# Patient Record
Sex: MALE | Race: WHITE | Employment: OTHER
[De-identification: names, ages, dates, MRNs, and addresses within clinical notes are randomized per-mention and may not be internally consistent; named-entity substitution may affect disease eponyms.]

---

## 2022-03-18 PROBLEM — Z87.442 HISTORY OF KIDNEY STONES: Status: ACTIVE | Noted: 2018-01-23

## 2022-03-20 PROBLEM — R31.29 MICROHEMATURIA: Status: ACTIVE | Noted: 2018-01-23

## 2023-09-11 ENCOUNTER — HOSPITAL ENCOUNTER (OUTPATIENT)
Facility: HOSPITAL | Age: 73
Setting detail: RECURRING SERIES
Discharge: HOME | End: 2023-09-14
Payer: COMMERCIAL

## 2023-09-11 PROCEDURE — 97161 PT EVAL LOW COMPLEX 20 MIN: CPT

## 2023-09-11 PROCEDURE — 97535 SELF CARE MNGMENT TRAINING: CPT

## 2023-09-11 NOTE — PROGRESS NOTES
[facility]  [address], [address] [facility]  Ph [phone]  Fx [phone]    Plan of Care/ Statement of Necessity for Physical Therapy Services      Patient name: Leonard Ball Start of Care: 2023   Referral source: Nikki Douglas : 1950    Medical Diagnosis: Lymphedema, not elsewhere classified [I89.0]   Onset Date:2 years ago    Treatment Diagnosis: I89.0 Lymphedema, not elsewhere classified     Prior Hospitalization: see medical history Provider#: [id number]   Medications: Verified on Patient summary List   Comorbidities: eczema; Venous hypertension. Sleep apnea, Afib, Morgan Felix ([facility]); Kidney stones, Appendectomy, gull bladder surgery,   Prior Level of Function: functionally independent, no AD, sedentary lifestyle      The Plan of Care and following information is based on the information from the initial evaluation. Assessment/ key information: 76 yo male who [facility] with c/o jett leg lymphedema. Patient  Pt reported that his swelling has been going on for 2 years ago. And first noticed with foot swelling. Pt reported that the limb started to weep in Feb/March. Pt was sent to a wound clinic in [place] and was seen for 3-4 times. And was introduced to compression garments. Pts wound has been healed. Pt was then referred to vascular. Pt received Ultra sound and has venous reflux on the right. Patient reports current management is over the counter garments and elevation. PT assessed garments however they did not fit pt and was creating tourniquets. PT recommends referral for medical grade compression. PT will need to clear MLD through Cardiac MD prior to referral for pump or initiation of MLD. 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 Patient demonstrates decreased strength, impaired fluid retention, pain and decreased functional mobility tolerance.      Patient will continue to

## 2023-09-11 NOTE — PROGRESS NOTES
PT DAILY TREATMENT NOTE/LYMPHLEG EVAL 10-18    Patient Name: Yossi Rucker    Date: 2023    : 1950  Insurance: Payor: Champ Lozano / Plan: Olive Azul / Product Type: *No Product type* /      Patient  verified yes     Visit #   Current / Total 1 24   Time   In / Out 239 315   Pain   In / Out 0 0     TREATMENT AREA =  Lymphedema, not elsewhere classified [I89.0]    SUBJECTIVE    Any medication changes, allergies to medications, adverse drug reactions, diagnosis change, or new procedure performed?: [x] No    [] Yes (see summary sheet for update)  Subjective functional status/changes:     PLOF: functionally independent, no AD, sedentary lifestyle  Limitations to PLOF: Difficulty wearing pants, difficulty walking, difficulty with wearing shoes   Current symptoms/Complaints: 0/10 at best; 3/10 at worst; pt reports they are able to sleep through the night   PMHx/Surgical Hx: eczema; Venous hypertension. Sleep apnea, Afib, Morgan Felix ([facility]); Kidney stones, Appendectomy, gull bladder surgery,     Lymphedema History: [] Primary [x] Secondary Cause: Pt reported that his swelling has been going on for 2 years ago. And first noticed with foot swelling. Pt reported that the limb started to weep in Feb/March. Pt was sent to a wound clinic in [place] and was seen for 3-4 times. And was introduced to compression garments. Pts wound has been healed. Pt was then referred to vascular. Pt received Ultra sound and has venous reflux on the right.        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

## 2023-09-15 ENCOUNTER — TELEPHONE (OUTPATIENT)
Facility: HOSPITAL | Age: 73
End: 2023-09-15

## 2023-09-19 ENCOUNTER — TELEPHONE (OUTPATIENT)
Facility: HOSPITAL | Age: 73
End: 2023-09-19

## 2023-09-19 ENCOUNTER — APPOINTMENT (OUTPATIENT)
Facility: HOSPITAL | Age: 73
End: 2023-09-19
Payer: COMMERCIAL

## 2023-09-19 NOTE — TELEPHONE ENCOUNTER
Spoke with Dr. Carine Amaya from [facility] who stated that pt has stable A fib and Aortic Aneurysm but risk would be low and pt is able to recieve lymphatic pump and be treated with MLD.

## 2023-09-26 ENCOUNTER — HOSPITAL ENCOUNTER (OUTPATIENT)
Facility: HOSPITAL | Age: 73
Setting detail: RECURRING SERIES
Discharge: HOME | End: 2023-09-29
Payer: COMMERCIAL

## 2023-09-26 PROCEDURE — 97112 NEUROMUSCULAR REEDUCATION: CPT

## 2023-09-26 PROCEDURE — 97530 THERAPEUTIC ACTIVITIES: CPT

## 2023-09-26 PROCEDURE — 97110 THERAPEUTIC EXERCISES: CPT

## 2023-09-26 PROCEDURE — 97535 SELF CARE MNGMENT TRAINING: CPT

## 2023-09-26 NOTE — PROGRESS NOTES
PHYSICAL / OCCUPATIONAL THERAPY - DAILY TREATMENT NOTE (updated )    Patient Name: Katrin Gonzalez    Date: 2023    : 1950  Insurance: Payor: French Bueno / Plan: [facility] / Product Type: *No Product type* /      Patient  verified Yes     Visit #   Current / Total 2 24   Time   In / Out 300 408   Pain   In / Out 0 0   Subjective Functional Status/Changes: Pt reported that he is only able to come in 1 time due to his work    Changes to: Allergies, Med Hx, Sx Hx?   no       TREATMENT AREA =  Lymphedema, not elsewhere classified [I89.0]    OBJECTIVE    Therapeutic Procedures: Tx Min Billable or 1:1 Min (if diff from Tx Min) Procedure, Rationale, Specifics   24  71327 Therapeutic Exercise (timed):  increase ROM, strength, coordination, balance, and proprioception to improve patient's ability to progress to PLOF and address remaining functional goals. (see flow sheet as applicable)    Details if applicable:  AROM/ Self MLD     8 8 40103 Therapeutic Activity (timed):  use of dynamic activities replicating functional movements to increase ROM, strength, coordination, balance, and proprioception in order to improve patient's ability to progress to PLOF and address remaining functional goals. (see flow sheet as applicable)    Details if applicable:  stocking donning and doffing of edema wear    63147 Self Care/Home Management (timed):  improve patient knowledge and understanding of activity modification, diagnosis/prognosis, and physical therapy expectations, procedures and progression  to improve patient's ability to progress to PLOF and address remaining functional goals.   (see flow sheet as applicable)     Details if applicable:  education on breaks during sitting and typing, education on skin care, education on prevention measures and decrease inflammation response   8 8 83341 Neuromuscular Re-Education (timed):  improve balance, coordination, kinesthetic sense, posture, core stability and

## 2023-10-03 ENCOUNTER — HOSPITAL ENCOUNTER (OUTPATIENT)
Facility: HOSPITAL | Age: 73
Setting detail: RECURRING SERIES
Discharge: HOME | End: 2023-10-06
Payer: COMMERCIAL

## 2023-10-03 PROCEDURE — 97112 NEUROMUSCULAR REEDUCATION: CPT

## 2023-10-03 PROCEDURE — 97110 THERAPEUTIC EXERCISES: CPT

## 2023-10-03 PROCEDURE — 97535 SELF CARE MNGMENT TRAINING: CPT

## 2023-10-03 NOTE — PROGRESS NOTES
Avis Manjarrez [facility]   11/28/2023  4:30 PM Bereket Carmona [facility]   12/5/2023  4:30 PM Bereket Carmona [facility]   12/12/2023  4:30 PM Bereket Carmona [facility]   12/19/2023  4:30 PM Bereket Carmona [facility]   12/26/2023  4:30 PM Bereket Carmona [facility]

## 2023-10-10 ENCOUNTER — HOSPITAL ENCOUNTER (OUTPATIENT)
Facility: HOSPITAL | Age: 73
Setting detail: RECURRING SERIES
Discharge: HOME | End: 2023-10-13
Payer: COMMERCIAL

## 2023-10-10 ENCOUNTER — TELEPHONE (OUTPATIENT)
Facility: HOSPITAL | Age: 73
End: 2023-10-10

## 2023-10-10 ENCOUNTER — APPOINTMENT (OUTPATIENT)
Facility: HOSPITAL | Age: 73
End: 2023-10-10
Payer: COMMERCIAL

## 2023-10-10 ENCOUNTER — HOSPITAL ENCOUNTER (EMERGENCY)
Facility: HOSPITAL | Age: 73
Discharge: HOME | End: 2023-10-10
Payer: COMMERCIAL

## 2023-10-10 VITALS
HEART RATE: 81 BPM | DIASTOLIC BLOOD PRESSURE: 101 MMHG | OXYGEN SATURATION: 94 % | WEIGHT: 260 LBS | SYSTOLIC BLOOD PRESSURE: 147 MMHG | BODY MASS INDEX: 39.4 KG/M2 | TEMPERATURE: 97.8 F | HEIGHT: 68 IN | RESPIRATION RATE: 20 BRPM

## 2023-10-10 DIAGNOSIS — L03.115 CELLULITIS OF RIGHT LOWER EXTREMITY: Primary | ICD-10-CM

## 2023-10-10 LAB — ECHO BSA: 2.38 M2

## 2023-10-10 PROCEDURE — 99284 EMERGENCY DEPT VISIT MOD MDM: CPT

## 2023-10-10 PROCEDURE — 6370000000 HC RX 637 (ALT 250 FOR IP): Performed by: PHYSICIAN ASSISTANT

## 2023-10-10 PROCEDURE — 97535 SELF CARE MNGMENT TRAINING: CPT

## 2023-10-10 PROCEDURE — 93971 EXTREMITY STUDY: CPT

## 2023-10-10 RX ORDER — CEPHALEXIN 250 MG/1
250 CAPSULE ORAL
Status: COMPLETED | OUTPATIENT
Start: 2023-10-10 | End: 2023-10-10

## 2023-10-10 RX ORDER — CEPHALEXIN 250 MG/1
250 CAPSULE ORAL 4 TIMES DAILY
Qty: 28 CAPSULE | Refills: 0 | Status: SHIPPED | OUTPATIENT
Start: 2023-10-10 | End: 2023-10-17

## 2023-10-10 RX ADMIN — CEPHALEXIN 250 MG: 250 CAPSULE ORAL at 19:11

## 2023-10-10 ASSESSMENT — PAIN - FUNCTIONAL ASSESSMENT: PAIN_FUNCTIONAL_ASSESSMENT: 0-10

## 2023-10-10 ASSESSMENT — PAIN SCALES - GENERAL: PAINLEVEL_OUTOF10: 8

## 2023-10-10 NOTE — ED PROVIDER NOTES
nontoxic. Disposition Considerations (tests considered but not done, Admit vs D/C, Shared Decision Making, Pt Expectation of Test or Tx.):        Diagnosis and Disposition       1. Cellulitis of right lower extremity        DISPOSITION Decision To Discharge 10/10/2023 06:57:53 PM      PATIENT REFERRED TO:  Mumtaz Bruno, [address]  [phone]    Schedule an appointment as soon as possible for a visit       [facility] EMERGENCY DEPT  [address] [place] [address]  [phone]    If symptoms worsen      DISCHARGE MEDICATIONS:  New Prescriptions    CEPHALEXIN (KEFLEX) 250 MG CAPSULE    Take 1 capsule by mouth 4 times daily for 7 days       DISCONTINUED MEDICATIONS:  Discontinued Medications    No medications on file              (Please note that portions of this note were completed with a voice recognition program.  Efforts were made to edit the dictations but occasionally words are mis-transcribed.)    MARTHA Grubbs (electronically signed)    Amina JON PA-C, am the primary clinician of record. [employer] Disclaimer     Please note that this dictation was completed with [employer], the computer voice recognition software. Quite often unanticipated grammatical, syntax, homophones, and other interpretive errors are inadvertently transcribed by the computer software. Please disregard these errors. Please excuse any errors that have escaped final proofreading.     Amina Angeles PA-C  (Electronically signed)         Mili Ricardo, [address] [place] [address]  10/10/23 5451

## 2023-10-10 NOTE — ED TRIAGE NOTES
Accidental fall 1 week ago today, has had home physical therapy since. Therapist noted that RLE is significantly more swollen, red, warm, bruised to touch, recommended to go to ED, +blood thinner use for Afib.  Pt is ambulatory but states pain is significantly worse when doing so

## 2023-10-10 NOTE — PROGRESS NOTES
PHYSICAL / OCCUPATIONAL THERAPY - DAILY TREATMENT NOTE (updated )    Patient Name: Keira Florentino    Date: 10/10/2023    : 1950  Insurance: Payor: Jacoby Vieira / Plan: Sonja Olivas / Product Type: *No Product type* /      Patient  verified Yes     Visit #   Current / Total 4 24   Time   In / Out 430 440   Pain   In / Out 0 0   Subjective Functional Status/Changes: Pt reported that swelling has increased in right leg since last visit. He is having trouble putting weight through it. It is hot and red. Changes to: Allergies, Med Hx, Sx Hx? yes  see above     TREATMENT AREA =  Lymphedema, not elsewhere classified [I89.0]    OBJECTIVE    Therapeutic Procedures: Tx Min Billable or 1:1 Min (if diff from Tx Min) Procedure, Rationale, Specifics   10 10 67083 Self Care/Home Management (timed):  improve patient knowledge and understanding of blood clots   to improve patient's ability to progress to PLOF and address remaining functional goals. (see flow sheet as applicable)    Details if applicable:  education regarding need for medical over site prior to resumption of treatment     10 10 [facility] Totals Reminder: bill using total billable min of TIMED therapeutic procedures (example: do not include dry needle or estim unattended, both untimed codes, in totals to left)  8-22 min = 1 unit; 23-37 min = 2 units; 38-52 min = 3 units; 53-67 min = 4 units; 68-82 min = 5 units   Total Total     TOTAL TREATMENT TIME:        10     [x]  Patient Education billed concurrently with other procedures   [x] Review HEP    [] Progressed/Changed HEP, detail:    [] Other detail:       Objective Information/Functional Measures/Assessment  Patient arrived with swollen Right LE despite wearing edema wear all week. Pts right limb was red from knee to ankle with warmth to touch. Recommended that pt go to ER and check for blood clot due to new swelling redness and warmth. PT held visit today and sent pt to ER to be checked.   Pt was due

## 2023-10-17 ENCOUNTER — APPOINTMENT (OUTPATIENT)
Facility: HOSPITAL | Age: 73
End: 2023-10-17
Payer: COMMERCIAL

## 2023-10-24 ENCOUNTER — APPOINTMENT (OUTPATIENT)
Facility: HOSPITAL | Age: 73
End: 2023-10-24
Payer: COMMERCIAL

## 2023-10-26 ENCOUNTER — HOSPITAL ENCOUNTER (OUTPATIENT)
Facility: HOSPITAL | Age: 73
Setting detail: RECURRING SERIES
Discharge: HOME | End: 2023-10-29
Payer: COMMERCIAL

## 2023-10-26 PROCEDURE — 97140 MANUAL THERAPY 1/> REGIONS: CPT

## 2023-10-26 PROCEDURE — 97535 SELF CARE MNGMENT TRAINING: CPT

## 2023-10-26 NOTE — PROGRESS NOTES
PHYSICAL / OCCUPATIONAL THERAPY - DAILY TREATMENT NOTE (updated )    Patient Name: Yossi Rucker    Date: 10/26/2023    : 1950  Insurance: Payor: Champ Lozano / Plan: Olive Azul / Product Type: *No Product type* /      Patient  verified Yes     Visit #   Current / Total 5 24   Time   In / Out 310 350   Pain   In / Out 0 0   Subjective Functional Status/Changes: Pt reported that he was neg for blood clot and has been taking antibiotics for cellulitis    Changes to: Allergies, Med Hx, Sx Hx? yes  see above      TREATMENT AREA =  Lymphedema, not elsewhere classified [I89.0]    OBJECTIVE         Therapeutic Procedures: Tx Min Billable or 1:1 Min (if diff from Tx Min) Procedure, Rationale, Specifics   30 30 40890 Manual Therapy (timed):  decrease pain, increase ROM, increase tissue extensibility, and decrease edema to improve patient's ability to progress to PLOF and address remaining functional goals. The manual therapy interventions were performed at a separate and distinct time from the therapeutic activities interventions . Details: MLD to right Lower leg spot treatment from popliteal nodes to ankle    Details if applicable:       10 10 99137 Self Care/Home Management (timed):  improve patient knowledge and understanding of physical therapy expectations, procedures and progression and long term management of lymphedema  to improve patient's ability to progress to PLOF and address remaining functional goals.   (see flow sheet as applicable)    Details if applicable:     36 40 [facility] Totals Reminder: bill using total billable min of TIMED therapeutic procedures (example: do not include dry needle or estim unattended, both untimed codes, in totals to left)  8-22 min = 1 unit; 23-37 min = 2 units; 38-52 min = 3 units; 53-67 min = 4 units; 68-82 min = 5 units   Total Total     TOTAL TREATMENT TIME:        40     [x]  Patient Education billed concurrently with other procedures   [x] Review HEP    []

## 2023-10-26 NOTE — PROGRESS NOTES
[facility] at [address]  [facility], [address] [facility]  Ph [phone]  Fx [phone]    Physical Therapy Progress Note  Patient name: Andrea Resendiz Start of Care: 2023   Referral source: Lauro Tejada : 1950               Medical Diagnosis: Lymphedema, not elsewhere classified [I89.0]    Onset Date:2 years ago               Treatment Diagnosis: I89.0 Lymphedema, not elsewhere classified     Prior Hospitalization: see medical history Provider#: [id number]   Medications: Verified on Patient summary List   Comorbidities: eczema; Venous hypertension. Sleep apnea, Afib, Timfan Felix ([facility]); Kidney stones, Appendectomy, gull bladder surgery,   Prior Level of Function: functionally independent, no AD, sedentary lifestyle    Visits from Start of Care: 5    Missed Visits: 2    Updated Goals/Measure of Progress: To be achieved in 24 treatments:    Short Term Goals: To be accomplished in 12 treatments:  Patient will be independent and compliant with HEP to progress toward goals and restore functional mobility. Eval Status: issued at eval  10/26/23: reviewed partial compliance      Patient will be independent and compliant with Self MLD to progress toward goals and improve independent management of Lymphedema. Eval Status: to be issued at future visit once cleared by cardiac MD   23: given today  10/26/23: reviewed max cueing      Patient will improve LLIS score by 10 %  in order to maximize function and promote patient satisfaction with overall outcome. Eval Status: LLIS to be taken at visit 2  23: 25%      Pt will be independent with teach back of lymphedema risk reduction techniques in order to self manage lymphedema.   Eval Status: to be given at visit 2  23: given today   10/26/23: reviewed today      Pt will decrease 10cm from malleoli right leg circumference measurement by 5 centimeters in order to improve pts ability to wear

## 2023-10-31 ENCOUNTER — HOSPITAL ENCOUNTER (OUTPATIENT)
Facility: HOSPITAL | Age: 73
Setting detail: RECURRING SERIES
Discharge: HOME | End: 2023-11-03
Payer: COMMERCIAL

## 2023-10-31 PROCEDURE — 97535 SELF CARE MNGMENT TRAINING: CPT

## 2023-10-31 NOTE — PROGRESS NOTES
PHYSICAL / OCCUPATIONAL THERAPY - DAILY TREATMENT NOTE (updated )    Patient Name: Maria M Stewart    Date: 10/31/2023    : 1950  Insurance: Payor: [employer] / Plan: Rivera Miracle / Product Type: *No Product type* /      Patient  verified Yes     Visit #   Current / Total 6 24   Time   In / Out 310 404   Pain   In / Out 0 0   Subjective Functional Status/Changes: Pt reported that he brought his bandages    Changes to: Allergies, Med Hx, Sx Hx?   no       TREATMENT AREA =  Lymphedema, not elsewhere classified [I89.0]    OBJECTIVE    Therapeutic Procedures: Tx Min Billable or 1:1 Min (if diff from Tx Min) Procedure, Rationale, Specifics   54 54 60388 Self Care/Home Management (timed):  improve patient knowledge and understanding of diagnosis/prognosis, physical therapy expectations, procedures and progression, and multilayer compression bandaging   to improve patient's ability to progress to PLOF and address remaining functional goals. (see flow sheet as applicable)    Details if applicable:       54 47 [facility] Totals Reminder: bill using total billable min of TIMED therapeutic procedures (example: do not include dry needle or estim unattended, both untimed codes, in totals to left)  8-22 min = 1 unit; 23-37 min = 2 units; 38-52 min = 3 units; 53-67 min = 4 units; 68-82 min = 5 units   Total Total     TOTAL TREATMENT TIME:        54     [x]  Patient Education billed concurrently with other procedures   [x] Review HEP    [] Progressed/Changed HEP, detail:    [] Other detail:       Objective Information/Functional Measures/Assessment    Patient tolerated treatment session well today. Patient had no complaints with addition of multilayer compression bandaging  to exercise program to accomplish improved fluid management. Pt reported that his spot on his knee has started to feel like a sore. PT looked at limb and there was a small area of black tissue and open wound.   Patient continues to make steady progress

## 2023-11-02 ENCOUNTER — TELEPHONE (OUTPATIENT)
Facility: HOSPITAL | Age: 73
End: 2023-11-02

## 2023-11-03 ENCOUNTER — APPOINTMENT (OUTPATIENT)
Facility: HOSPITAL | Age: 73
End: 2023-11-03
Payer: COMMERCIAL

## 2023-11-07 ENCOUNTER — APPOINTMENT (OUTPATIENT)
Facility: HOSPITAL | Age: 73
End: 2023-11-07
Payer: COMMERCIAL

## 2023-11-10 ENCOUNTER — HOSPITAL ENCOUNTER (OUTPATIENT)
Facility: HOSPITAL | Age: 73
Setting detail: RECURRING SERIES
Discharge: HOME | End: 2023-11-13
Payer: COMMERCIAL

## 2023-11-10 PROCEDURE — 97535 SELF CARE MNGMENT TRAINING: CPT

## 2023-11-10 PROCEDURE — 97110 THERAPEUTIC EXERCISES: CPT

## 2023-11-10 NOTE — PROGRESS NOTES
Patient Education billed concurrently with other procedures   [x] Review HEP    [] Progressed/Changed HEP, detail:    [] Other detail:       Objective Information/Functional Measures/Assessment    Patient tolerated treatment session well today. Patients wife attended session today to see AROM and multilayer compression bandaging to exercise program to accomplish improve lymphatic activation. Pt and wife given demo of multilayer compression bandaging. Discussed that pt may be ready for transition to stocking on left. Patient continues to make slow steady progress toward goals and would benefit from continued skilled PT intervention to address remaining deficits outlined in goals below. Patient will continue to benefit from skilled PT services to modify and progress therapeutic interventions, analyze and address soft tissue restrictions and decrease fliud retension and improve tissue healing to attain remaining goals. Progress toward goals / Updated goals:  []  See Progress Note/Recertification    Short Term Goals: To be accomplished in 12 treatments:  Patient will be independent and compliant with HEP to progress toward goals and restore functional mobility. Eval Status: issued at eval  10/26/23: reviewed partial compliance      Patient will be independent and compliant with Self MLD to progress toward goals and improve independent management of Lymphedema. Eval Status: to be issued at future visit once cleared by cardiac MD   9/26/23: given today  10/26/23: reviewed max cueing      Patient will improve LLIS score by 10 %  in order to maximize function and promote patient satisfaction with overall outcome. Eval Status: LLIS to be taken at visit 2  9/26/23: 25%      Pt will be independent with teach back of lymphedema risk reduction techniques in order to self manage lymphedema.   Eval Status: to be given at visit 2  9/26/23: given today   10/26/23: reviewed today      Pt will decrease 10cm from

## 2023-11-14 ENCOUNTER — HOSPITAL ENCOUNTER (OUTPATIENT)
Facility: HOSPITAL | Age: 73
Setting detail: RECURRING SERIES
Discharge: HOME | End: 2023-11-17
Payer: COMMERCIAL

## 2023-11-14 PROCEDURE — 97530 THERAPEUTIC ACTIVITIES: CPT

## 2023-11-14 PROCEDURE — 97535 SELF CARE MNGMENT TRAINING: CPT

## 2023-11-14 NOTE — PROGRESS NOTES
PHYSICAL / OCCUPATIONAL THERAPY - DAILY TREATMENT NOTE (updated )    Patient Name: Abundio Holliday    Date: 2023    : 1950  Insurance: Payor: Les Rivera / Plan: Dona Hicks / Product Type: *No Product type* /      Patient  verified Yes     Visit #   Current / Total 8 24   Time   In / Out 430 530   Pain   In / Out 0 0   Subjective Functional Status/Changes: Pt reported that he is trying to be a good patient. Changes to: Allergies, Med Hx, Sx Hx?   no       TREATMENT AREA =  Lymphedema, not elsewhere classified [I89.0]    OBJECTIVE    Therapeutic Procedures: Tx Min Billable or 1:1 Min (if diff from Tx Min) Procedure, Rationale, Specifics   52 52 76131 Self Care/Home Management (timed):  improve patient knowledge and understanding of activity modification, diagnosis/prognosis, and physical therapy expectations, procedures and progression  to improve patient's ability to progress to PLOF and address remaining functional goals. (see flow sheet as applicable)    Details if applicable:  long term management and garments, multilevel compression bandaging     Multilayer compression bandaging: right LE: 4 in stockinet, cotton, grey foam clamshell forefoot and medial/lateral malleoli pieces, binder,  6 cm laquita sandal, 8 cm HAS bandage, 10 cm circular malleoli to knee, and 10 cm circulare malleoli to knee      8 8 16937 Therapeutic Activity (timed):  use of dynamic activities replicating functional movements to increase ROM, strength, coordination, balance, and proprioception in order to improve patient's ability to progress to PLOF and address remaining functional goals.   (see flow sheet as applicable)    Details if applicable:  garment donning    60 60 [facility] Totals Reminder: bill using total billable min of TIMED therapeutic procedures (example: do not include dry needle or estim unattended, both untimed codes, in totals to left)  8-22 min = 1 unit; 23-37 min = 2 units; 38-52 min = 3 units; 53-67 min = 4

## 2023-11-21 ENCOUNTER — HOSPITAL ENCOUNTER (OUTPATIENT)
Facility: HOSPITAL | Age: 73
Setting detail: RECURRING SERIES
Discharge: HOME | End: 2023-11-24
Payer: COMMERCIAL

## 2023-11-21 PROCEDURE — 97535 SELF CARE MNGMENT TRAINING: CPT

## 2023-11-21 PROCEDURE — 97110 THERAPEUTIC EXERCISES: CPT

## 2023-11-21 NOTE — PROGRESS NOTES
[facility] [address] Tino Naraayn, [address] [facility]  Ph [phone]  Fx [phone]    Physical Therapy Progress Note  Patient name: Yossi Rucker Start of Care: 2023   Referral source: Amira Michel : 1950               Medical Diagnosis: Lymphedema, not elsewhere classified [I89.0]    Onset Date:2 years ago               Treatment Diagnosis: I89.0 Lymphedema, not elsewhere classified     Prior Hospitalization: see medical history Provider#: [id number]   Medications: Verified on Patient summary List   Comorbidities: eczema; Venous hypertension. Sleep apnea, Afib, Timfan Felix ([facility]); Kidney stones, Appendectomy, gull bladder surgery,   Prior Level of Function: functionally independent, no AD, sedentary lifestyle    Visits from Start of Care: 9    Missed Visits: 3    Updated Goals/Measure of Progress: To be achieved in 24 treatments:    Short Term Goals: To be accomplished in 12 treatments:  Patient will be independent and compliant with HEP to progress toward goals and restore functional mobility. Eval Status: issued at eval  10/26/23: reviewed partial compliance      Patient will be independent and compliant with Self MLD to progress toward goals and improve independent management of Lymphedema. Eval Status: to be issued at future visit once cleared by cardiac MD   23: given today  10/26/23: reviewed max cueing   23: reviewed max cueing pt given youtube video to follow      Patient will improve LLIS score by 10 %  in order to maximize function and promote patient satisfaction with overall outcome. Eval Status: LLIS to be taken at visit 2  23: 25%   23: 19 % Progressing      Pt will be independent with teach back of lymphedema risk reduction techniques in order to self manage lymphedema.   Eval Status: to be given at visit 2  10/26/23: reviewed today   23: reviewed today min cueing      Pt will decrease 10cm
11/21/23 PN PROGRESSING   Circumference measurements right (cm) left (cm)   Forefoot  24.5 23.5   Figure 8 59 57   Malleoli  31 29   10 cm proximal to malleoli 39 33   20 cm proximal to malleoli 42 39   10 cm distal to knee 43 42   Patella  47 41         PLAN  Yes  Continue plan of care  []  Upgrade activities as tolerated  []  Discharge due to :  []  Other:    Nati Morillo PT    11/21/2023    10:26 AM    Future Appointments   Date Time Provider [address]   11/21/2023  4:30 PM Nati Morillo PT [facility]   11/28/2023  4:30 PM Nati Morillo PT [facility]   12/5/2023  4:30 PM Torito Block [facility]   12/12/2023  4:30 PM Torito Block [facility]   12/19/2023  4:30 PM Torito Block [facility]   12/28/2023  4:30 PM Torito Block [facility]

## 2023-11-27 ENCOUNTER — TELEPHONE (OUTPATIENT)
Facility: HOSPITAL | Age: 73
End: 2023-11-27

## 2023-11-28 ENCOUNTER — APPOINTMENT (OUTPATIENT)
Facility: HOSPITAL | Age: 73
End: 2023-11-28
Payer: COMMERCIAL

## 2023-12-05 ENCOUNTER — HOSPITAL ENCOUNTER (OUTPATIENT)
Facility: HOSPITAL | Age: 73
Setting detail: RECURRING SERIES
Discharge: HOME | End: 2023-12-08
Payer: COMMERCIAL

## 2023-12-05 PROCEDURE — 97530 THERAPEUTIC ACTIVITIES: CPT

## 2023-12-05 PROCEDURE — 97535 SELF CARE MNGMENT TRAINING: CPT

## 2023-12-05 NOTE — PROGRESS NOTES
[facility] [place] [address] [address] [place], [address] [facility]  Ph [phone]  Fx [phone]    Physical Therapy Discharge Summary    Patient name: Katrin Gonzalez Start of Care: 2023   Referral source: Tulio Sexton : 1950               Medical Diagnosis: Lymphedema, not elsewhere classified [I89.0]    Onset Date:2 years ago               Treatment Diagnosis: I89.0 Lymphedema, not elsewhere classified     Prior Hospitalization: see medical history Provider#: [id number]   Medications: Verified on Patient summary List   Comorbidities: eczema; Venous hypertension. Sleep apnea, Afib, [facility] Kidney stones, Appendectomy, gull bladder surgery,   Prior Level of Function: functionally independent, no AD, sedentary lifestyle    Visits from Start of Care: 10    Missed Visits: 3    Reporting Period : 23 to 23    Goals/Measure of Progress:  Short Term Goals: To be accomplished in 12 treatments:  Patient will be independent and compliant with HEP to progress toward goals and restore functional mobility. Eval Status: issued at eval  10/26/23: reviewed partial compliance   23: minimal compliance with AROM exercises     Patient will be independent and compliant with Self MLD to progress toward goals and improve independent management of Lymphedema. Eval Status: to be issued at future visit once cleared by cardiac MD   23: given today  23: reviewed max cueing pt given youtube video to follow   23: minimal compliance     Patient will improve LLIS score by 10 %  in order to maximize function and promote patient satisfaction with overall outcome. Eval Status: LLIS to be taken at visit 2  23: 25%   23: 19 % Progressing      Pt will be independent with teach back of lymphedema risk reduction techniques in order to self manage lymphedema.   Eval Status: to be given at visit 2  10/26/23: reviewed today   23:
to knee 46 45   Patella  50.5 44    11/21/23 PN PROGRESSING   Circumference measurements right (cm) left (cm)   Forefoot  24.5 23.5   Figure 8 59 57   Malleoli  31 29   10 cm proximal to malleoli 39 33   20 cm proximal to malleoli 42 39   10 cm distal to knee 43 42   Patella  47 41    11/21/23 PN GOAL MET   Circumference measurements right (cm) left (cm)   Forefoot  24 23   Figure 8 59 56   Malleoli  30.5 27   10 cm proximal to malleoli 38 31.5   20 cm proximal to malleoli 42.5 38.2   10 cm distal to knee 42.8 42   Patella  47 41        PLAN  No  Continue plan of care  []  Upgrade activities as tolerated  [x]  Discharge due to : transition to [facility]   []  Other:    Walker Carmona PT    12/5/2023    1:16 PM    Future Appointments   Date Time Provider [address]   12/5/2023  4:30 PM Torito Dye [facility]   12/12/2023  4:30 PM Walker Carmona PT [facility]   12/19/2023  4:30 PM Torito Dye [facility]   12/28/2023  4:30 PM Torito Dye [facility]

## 2023-12-12 ENCOUNTER — APPOINTMENT (OUTPATIENT)
Facility: HOSPITAL | Age: 73
End: 2023-12-12
Payer: COMMERCIAL

## 2023-12-19 ENCOUNTER — APPOINTMENT (OUTPATIENT)
Facility: HOSPITAL | Age: 73
End: 2023-12-19
Payer: COMMERCIAL

## 2023-12-26 ENCOUNTER — APPOINTMENT (OUTPATIENT)
Facility: HOSPITAL | Age: 73
End: 2023-12-26
Payer: COMMERCIAL

## 2023-12-28 ENCOUNTER — APPOINTMENT (OUTPATIENT)
Facility: HOSPITAL | Age: 73
End: 2023-12-28
Payer: COMMERCIAL